# Patient Record
Sex: FEMALE | Race: WHITE | NOT HISPANIC OR LATINO | URBAN - METROPOLITAN AREA
[De-identification: names, ages, dates, MRNs, and addresses within clinical notes are randomized per-mention and may not be internally consistent; named-entity substitution may affect disease eponyms.]

---

## 2017-05-30 ENCOUNTER — IMPORTED ENCOUNTER (OUTPATIENT)
Dept: URBAN - METROPOLITAN AREA CLINIC 43 | Facility: CLINIC | Age: 82
End: 2017-05-30

## 2017-05-30 PROBLEM — H16.223: Noted: 2017-05-30

## 2017-05-30 PROBLEM — H35.3232: Noted: 2017-05-30

## 2017-05-30 PROBLEM — H40.013: Noted: 2017-05-30

## 2017-07-19 ENCOUNTER — IMPORTED ENCOUNTER (OUTPATIENT)
Dept: URBAN - METROPOLITAN AREA CLINIC 43 | Facility: CLINIC | Age: 82
End: 2017-07-19

## 2017-08-10 ENCOUNTER — IMPORTED ENCOUNTER (OUTPATIENT)
Dept: URBAN - METROPOLITAN AREA CLINIC 43 | Facility: CLINIC | Age: 82
End: 2017-08-10

## 2017-12-12 ENCOUNTER — IMPORTED ENCOUNTER (OUTPATIENT)
Dept: URBAN - METROPOLITAN AREA CLINIC 43 | Facility: CLINIC | Age: 82
End: 2017-12-12

## 2018-04-19 NOTE — PATIENT DISCUSSION
EPITHELIAL BASEMENT MEMBRANE DYSTROPHY, OU:  PRESCRIBE MASHA  128 5% DROPS QHS OU. REFER TO DR. Preet Bobby.

## 2018-04-19 NOTE — PATIENT DISCUSSION
Epithelial Membrane Dystrophy Counseling: The nature of the dystrophy was explained to the patient including the fact that often times the dystrophy is asymptomatic. When symptoms such as fluctuating vision, foreign body sensation, or decreased vision present, the treatment can include Patria ointment or drops, artificial tears, punctal plugs, epithelial debridement /scraping and possible polishing of underlying cornea.   If the dystrophy interferes with cataract testing, treatment may need to be initiated first.

## 2020-04-18 ASSESSMENT — VISUAL ACUITY
OS_CC: 20/70+
OD_CC: 20/200
OS_CC: J5
OD_CC: J5

## 2020-04-18 ASSESSMENT — TONOMETRY
OS_IOP_MMHG: 14.0
OD_IOP_MMHG: 14.0

## 2021-04-08 NOTE — PATIENT DISCUSSION
No findings consistent with diabetic changes seen today. Re-evaluate yearly with dilated exam and documentation of A1C. Document OCT MAC yearly. Letter sent to primary care physician.

## 2021-04-08 NOTE — PATIENT DISCUSSION
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 They elect at this time not to proceed with a referral to ocuplastics. Recommend they evaluate their vision as it may decrease if no treatment is pursued.

## 2021-04-08 NOTE — PATIENT DISCUSSION
Mild guttata noted, no edema. Use Patria ointment nightly or drops 2-3x daily as directed. Specular microscopy testing to be documented every 1-3 years.

## 2021-04-08 NOTE — PATIENT DISCUSSION
Prescribe preservative free artificial tears. Recommend use of fish oil supplements and daily warm compresses. Limit fast moving air to eyes.

## 2022-09-22 NOTE — PATIENT DISCUSSION
Patient defers the completed refraction today. The refraction was documented for testing purposes, did not bill. Re-do AR/MR at next visit after dry eyes have improved.